# Patient Record
Sex: MALE | Race: WHITE
[De-identification: names, ages, dates, MRNs, and addresses within clinical notes are randomized per-mention and may not be internally consistent; named-entity substitution may affect disease eponyms.]

---

## 2017-05-23 ENCOUNTER — HOSPITAL ENCOUNTER (INPATIENT)
Dept: HOSPITAL 79 - ER1 | Age: 57
LOS: 2 days | Discharge: HOME | DRG: 304 | End: 2017-05-25
Attending: INTERNAL MEDICINE | Admitting: INTERNAL MEDICINE
Payer: COMMERCIAL

## 2017-05-23 VITALS — BODY MASS INDEX: 31.18 KG/M2 | HEIGHT: 72 IN | WEIGHT: 230.19 LBS

## 2017-05-23 DIAGNOSIS — I16.1: Primary | ICD-10-CM

## 2017-05-23 DIAGNOSIS — E66.9: ICD-10-CM

## 2017-05-23 DIAGNOSIS — Z82.49: ICD-10-CM

## 2017-05-23 DIAGNOSIS — I50.31: ICD-10-CM

## 2017-05-23 DIAGNOSIS — Z79.84: ICD-10-CM

## 2017-05-23 DIAGNOSIS — E78.5: ICD-10-CM

## 2017-05-23 DIAGNOSIS — K21.9: ICD-10-CM

## 2017-05-23 DIAGNOSIS — I11.0: ICD-10-CM

## 2017-05-23 DIAGNOSIS — E11.9: ICD-10-CM

## 2017-05-23 DIAGNOSIS — I47.2: ICD-10-CM

## 2017-05-23 LAB
BUN/CREATININE RATIO: 13 (ref 0–10)
HGB BLD-MCNC: 13.8 GM/DL (ref 14–17.5)
RED BLOOD COUNT: 5.02 M/UL (ref 4.2–5.5)
WHITE BLOOD COUNT: 8.8 K/UL (ref 4.5–11)

## 2017-05-23 PROCEDURE — A9502 TC99M TETROFOSMIN: HCPCS

## 2017-05-24 LAB
BUN/CREATININE RATIO: 16 (ref 0–10)
HGB BLD-MCNC: 13.4 GM/DL (ref 14–17.5)
RED BLOOD COUNT: 4.85 M/UL (ref 4.2–5.5)
WHITE BLOOD COUNT: 10.2 K/UL (ref 4.5–11)

## 2017-05-24 NOTE — NUR
DR. KELSEY ON FLOOR AND MADE AWARE OF ADMISSION VITAL SIGNS-ELEVATED BLOOD
PRESSURE.  STAT CE AND EKG DONE.  AMD. NTG 0.4ML SL X 1 AT 2357 FOR CHEST
TIGHTNESS WITH DR. KELSEY IN ROOM.  PT. STATES, "HELPED TIGHTNESS, BUT HAS
REALLY BAD HEADACHE NOW."  192/122 POST NTG.  DR. KELSEY MADE DECISION TO
TRANSFER TO ICU.  PT. WAS MEDICATED WITH ISORDIL 30MG PO, METEPROLOL 50MG PO,
PLAVIX 300MG PO, TYLENOL 650MG PO, MORPHINE 2MG IV.  REPORT TO REI MILLER RN.  PT. TRANSPORTED VIA STRETCHER.  DENIES PAIN AT TRANSPORT.  STAT COAGS
DRAWN AT 0000 FOR HEPARIN PROTOCOL.

## 2021-04-14 ENCOUNTER — HOSPITAL ENCOUNTER (EMERGENCY)
Dept: HOSPITAL 79 - ER1 | Age: 61
Discharge: HOME | End: 2021-04-14
Payer: COMMERCIAL

## 2021-04-14 DIAGNOSIS — S51.011A: Primary | ICD-10-CM

## 2021-04-14 DIAGNOSIS — I10: ICD-10-CM

## 2021-04-14 DIAGNOSIS — Z23: ICD-10-CM
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
## 2021-04-14 DIAGNOSIS — E11.9: ICD-10-CM

## 2021-04-14 DIAGNOSIS — Z79.899: ICD-10-CM

## 2021-07-12 ENCOUNTER — HOSPITAL ENCOUNTER (EMERGENCY)
Dept: HOSPITAL 79 - ER1 | Age: 61
Discharge: HOME | End: 2021-07-12
Payer: COMMERCIAL

## 2021-07-12 DIAGNOSIS — Z20.822: ICD-10-CM

## 2021-07-12 DIAGNOSIS — R42: ICD-10-CM

## 2021-07-12 DIAGNOSIS — E11.9: ICD-10-CM

## 2021-07-12 DIAGNOSIS — A08.4: Primary | ICD-10-CM

## 2021-07-12 DIAGNOSIS — I10: ICD-10-CM

## 2021-07-12 LAB
BUN/CREATININE RATIO: 7 (ref 0–10)
HGB BLD-MCNC: 13.9 GM/DL (ref 14–17.5)
RED BLOOD COUNT: 5.08 M/UL (ref 4.2–5.5)
WHITE BLOOD COUNT: 6.6 K/UL (ref 4.5–11)

## 2022-09-29 ENCOUNTER — HOSPITAL ENCOUNTER (OUTPATIENT)
Dept: HOSPITAL 79 - HEART CORB | Age: 62
End: 2022-09-29
Attending: INTERNAL MEDICINE
Payer: COMMERCIAL

## 2022-09-29 DIAGNOSIS — I47.1: Primary | ICD-10-CM

## 2022-09-29 DIAGNOSIS — R00.1: ICD-10-CM
